# Patient Record
Sex: FEMALE | Race: WHITE | Employment: OTHER | URBAN - METROPOLITAN AREA
[De-identification: names, ages, dates, MRNs, and addresses within clinical notes are randomized per-mention and may not be internally consistent; named-entity substitution may affect disease eponyms.]

---

## 2018-06-06 ENCOUNTER — HOSPITAL ENCOUNTER (EMERGENCY)
Age: 63
Discharge: HOME | End: 2018-06-06
Attending: EMERGENCY MEDICINE | Admitting: EMERGENCY MEDICINE
Payer: OTHER GOVERNMENT

## 2018-06-06 VITALS
RESPIRATION RATE: 22 BRPM | DIASTOLIC BLOOD PRESSURE: 87 MMHG | WEIGHT: 170.2 LBS | SYSTOLIC BLOOD PRESSURE: 157 MMHG | HEART RATE: 102 BPM | OXYGEN SATURATION: 97 % | TEMPERATURE: 97.5 F

## 2018-06-06 DIAGNOSIS — T78.40XA ALLERGIC REACTION, INITIAL ENCOUNTER: Primary | ICD-10-CM

## 2018-06-06 LAB
ALBUMIN SERPL-MCNC: 4.7 G/DL (ref 3.5–5)
ALBUMIN/GLOB SERPL: 1.4 {RATIO} (ref 1.1–2.2)
ALP SERPL-CCNC: 70 U/L (ref 45–117)
ALT SERPL-CCNC: 49 U/L (ref 12–78)
ANION GAP SERPL CALC-SCNC: 10 MMOL/L (ref 5–15)
APPEARANCE UR: CLEAR
AST SERPL-CCNC: 30 U/L (ref 15–37)
BACTERIA URNS QL MICRO: NEGATIVE /HPF
BASOPHILS # BLD: 0.1 K/UL (ref 0–0.1)
BASOPHILS NFR BLD: 1 % (ref 0–1)
BILIRUB SERPL-MCNC: 0.3 MG/DL (ref 0.2–1)
BILIRUB UR QL: NEGATIVE
BUN SERPL-MCNC: 21 MG/DL (ref 6–20)
BUN/CREAT SERPL: 20 (ref 12–20)
C TRACH DNA SPEC QL NAA+PROBE: NEGATIVE
CALCIUM SERPL-MCNC: 9.4 MG/DL (ref 8.5–10.1)
CHLORIDE SERPL-SCNC: 106 MMOL/L (ref 97–108)
CLUE CELLS VAG QL WET PREP: NORMAL
CO2 SERPL-SCNC: 25 MMOL/L (ref 21–32)
COLOR UR: NORMAL
CREAT SERPL-MCNC: 1.04 MG/DL (ref 0.55–1.02)
CRP SERPL-MCNC: <0.29 MG/DL (ref 0–0.6)
DIFFERENTIAL METHOD BLD: ABNORMAL
EOSINOPHIL # BLD: 0.4 K/UL (ref 0–0.4)
EOSINOPHIL NFR BLD: 4 % (ref 0–7)
EPITH CASTS URNS QL MICRO: NORMAL /LPF
ERYTHROCYTE [DISTWIDTH] IN BLOOD BY AUTOMATED COUNT: 13.2 % (ref 11.5–14.5)
GLOBULIN SER CALC-MCNC: 3.4 G/DL (ref 2–4)
GLUCOSE SERPL-MCNC: 99 MG/DL (ref 65–100)
GLUCOSE UR STRIP.AUTO-MCNC: NEGATIVE MG/DL
HCT VFR BLD AUTO: 45.5 % (ref 35–47)
HGB BLD-MCNC: 15 G/DL (ref 11.5–16)
HGB UR QL STRIP: NEGATIVE
HYALINE CASTS URNS QL MICRO: NORMAL /LPF (ref 0–5)
IMM GRANULOCYTES # BLD: 0 K/UL (ref 0–0.04)
IMM GRANULOCYTES NFR BLD AUTO: 0 % (ref 0–0.5)
KETONES UR QL STRIP.AUTO: NEGATIVE MG/DL
KOH PREP SPEC: NORMAL
LEUKOCYTE ESTERASE UR QL STRIP.AUTO: NEGATIVE
LYMPHOCYTES # BLD: 4.2 K/UL (ref 0.8–3.5)
LYMPHOCYTES NFR BLD: 38 % (ref 12–49)
MCH RBC QN AUTO: 28.6 PG (ref 26–34)
MCHC RBC AUTO-ENTMCNC: 33 G/DL (ref 30–36.5)
MCV RBC AUTO: 86.7 FL (ref 80–99)
MONOCYTES # BLD: 0.5 K/UL (ref 0–1)
MONOCYTES NFR BLD: 4 % (ref 5–13)
N GONORRHOEA DNA SPEC QL NAA+PROBE: NEGATIVE
NEUTS SEG # BLD: 5.8 K/UL (ref 1.8–8)
NEUTS SEG NFR BLD: 53 % (ref 32–75)
NITRITE UR QL STRIP.AUTO: NEGATIVE
NRBC # BLD: 0 K/UL (ref 0–0.01)
NRBC BLD-RTO: 0 PER 100 WBC
PH UR STRIP: 6 [PH] (ref 5–8)
PLATELET # BLD AUTO: 344 K/UL (ref 150–400)
PMV BLD AUTO: 9.6 FL (ref 8.9–12.9)
POTASSIUM SERPL-SCNC: 4.3 MMOL/L (ref 3.5–5.1)
PROT SERPL-MCNC: 8.1 G/DL (ref 6.4–8.2)
PROT UR STRIP-MCNC: NEGATIVE MG/DL
RBC # BLD AUTO: 5.25 M/UL (ref 3.8–5.2)
RBC #/AREA URNS HPF: NORMAL /HPF (ref 0–5)
SAMPLE TYPE: NORMAL
SERVICE CMNT-IMP: NORMAL
SERVICE CMNT-IMP: NORMAL
SODIUM SERPL-SCNC: 141 MMOL/L (ref 136–145)
SP GR UR REFRACTOMETRY: 1.01 (ref 1–1.03)
SPECIMEN SOURCE: NORMAL
T VAGINALIS VAG QL WET PREP: NORMAL
UA: UC IF INDICATED,UAUC: NORMAL
UROBILINOGEN UR QL STRIP.AUTO: 0.2 EU/DL (ref 0.2–1)
WBC # BLD AUTO: 11 K/UL (ref 3.6–11)
WBC URNS QL MICRO: NORMAL /HPF (ref 0–4)

## 2018-06-06 PROCEDURE — 74011250637 HC RX REV CODE- 250/637: Performed by: EMERGENCY MEDICINE

## 2018-06-06 PROCEDURE — 99285 EMERGENCY DEPT VISIT HI MDM: CPT

## 2018-06-06 PROCEDURE — 87210 SMEAR WET MOUNT SALINE/INK: CPT | Performed by: EMERGENCY MEDICINE

## 2018-06-06 PROCEDURE — 87491 CHLMYD TRACH DNA AMP PROBE: CPT | Performed by: EMERGENCY MEDICINE

## 2018-06-06 PROCEDURE — 96374 THER/PROPH/DIAG INJ IV PUSH: CPT

## 2018-06-06 PROCEDURE — 74011250636 HC RX REV CODE- 250/636: Performed by: EMERGENCY MEDICINE

## 2018-06-06 PROCEDURE — 36415 COLL VENOUS BLD VENIPUNCTURE: CPT | Performed by: EMERGENCY MEDICINE

## 2018-06-06 PROCEDURE — 81001 URINALYSIS AUTO W/SCOPE: CPT | Performed by: EMERGENCY MEDICINE

## 2018-06-06 PROCEDURE — 74011000250 HC RX REV CODE- 250: Performed by: EMERGENCY MEDICINE

## 2018-06-06 PROCEDURE — 96361 HYDRATE IV INFUSION ADD-ON: CPT

## 2018-06-06 PROCEDURE — 74011250636 HC RX REV CODE- 250/636

## 2018-06-06 PROCEDURE — 85025 COMPLETE CBC W/AUTO DIFF WBC: CPT | Performed by: EMERGENCY MEDICINE

## 2018-06-06 PROCEDURE — 96372 THER/PROPH/DIAG INJ SC/IM: CPT

## 2018-06-06 PROCEDURE — 86140 C-REACTIVE PROTEIN: CPT | Performed by: EMERGENCY MEDICINE

## 2018-06-06 PROCEDURE — 87086 URINE CULTURE/COLONY COUNT: CPT | Performed by: EMERGENCY MEDICINE

## 2018-06-06 PROCEDURE — 80053 COMPREHEN METABOLIC PANEL: CPT | Performed by: EMERGENCY MEDICINE

## 2018-06-06 PROCEDURE — 96375 TX/PRO/DX INJ NEW DRUG ADDON: CPT

## 2018-06-06 PROCEDURE — 96376 TX/PRO/DX INJ SAME DRUG ADON: CPT

## 2018-06-06 RX ORDER — ACETAMINOPHEN 500 MG
1000 TABLET ORAL
Status: COMPLETED | OUTPATIENT
Start: 2018-06-06 | End: 2018-06-06

## 2018-06-06 RX ORDER — METHYLPREDNISOLONE 4 MG/1
TABLET ORAL
Qty: 1 DOSE PACK | Refills: 0 | Status: SHIPPED | OUTPATIENT
Start: 2018-06-06

## 2018-06-06 RX ORDER — FAMOTIDINE 20 MG/1
20 TABLET, FILM COATED ORAL 2 TIMES DAILY
Qty: 20 TAB | Refills: 0 | Status: SHIPPED | OUTPATIENT
Start: 2018-06-06 | End: 2018-06-16

## 2018-06-06 RX ORDER — DIPHENHYDRAMINE HCL 25 MG
25 TABLET ORAL
Qty: 30 TAB | Refills: 0 | Status: SHIPPED | OUTPATIENT
Start: 2018-06-06

## 2018-06-06 RX ORDER — DIPHENHYDRAMINE HYDROCHLORIDE 50 MG/ML
25 INJECTION, SOLUTION INTRAMUSCULAR; INTRAVENOUS
Status: COMPLETED | OUTPATIENT
Start: 2018-06-06 | End: 2018-06-06

## 2018-06-06 RX ORDER — EPINEPHRINE 1 MG/ML
INJECTION, SOLUTION, CONCENTRATE INTRAVENOUS
Status: DISCONTINUED
Start: 2018-06-06 | End: 2018-06-06 | Stop reason: HOSPADM

## 2018-06-06 RX ORDER — EPINEPHRINE 1 MG/ML
0.3 INJECTION, SOLUTION, CONCENTRATE INTRAVENOUS
Status: COMPLETED | OUTPATIENT
Start: 2018-06-06 | End: 2018-06-06

## 2018-06-06 RX ORDER — DIPHENHYDRAMINE HYDROCHLORIDE 50 MG/ML
INJECTION, SOLUTION INTRAMUSCULAR; INTRAVENOUS
Status: COMPLETED
Start: 2018-06-06 | End: 2018-06-06

## 2018-06-06 RX ORDER — DIPHENHYDRAMINE HYDROCHLORIDE 50 MG/ML
50 INJECTION, SOLUTION INTRAMUSCULAR; INTRAVENOUS
Status: COMPLETED | OUTPATIENT
Start: 2018-06-06 | End: 2018-06-06

## 2018-06-06 RX ADMIN — METHYLPREDNISOLONE SODIUM SUCCINATE 125 MG: 125 INJECTION, POWDER, FOR SOLUTION INTRAMUSCULAR; INTRAVENOUS at 00:54

## 2018-06-06 RX ADMIN — EPINEPHRINE 0.3 MG: 1 INJECTION, SOLUTION, CONCENTRATE INTRAVENOUS at 01:12

## 2018-06-06 RX ADMIN — DIPHENHYDRAMINE HYDROCHLORIDE 25 MG: 50 INJECTION, SOLUTION INTRAMUSCULAR; INTRAVENOUS at 03:46

## 2018-06-06 RX ADMIN — SODIUM CHLORIDE 1000 ML: 900 INJECTION, SOLUTION INTRAVENOUS at 01:01

## 2018-06-06 RX ADMIN — DIPHENHYDRAMINE HYDROCHLORIDE 50 MG: 50 INJECTION, SOLUTION INTRAMUSCULAR; INTRAVENOUS at 01:00

## 2018-06-06 RX ADMIN — SODIUM CHLORIDE 20 MG: 9 INJECTION INTRAMUSCULAR; INTRAVENOUS; SUBCUTANEOUS at 00:56

## 2018-06-06 RX ADMIN — ACETAMINOPHEN 1000 MG: 500 TABLET, FILM COATED ORAL at 03:45

## 2018-06-06 NOTE — ED NOTES
Pt called out, obviously frustrated, states her itching is worsening. Noted red, flat rash spreading from buttock/hips/groin radiating into back, torso, abd. Also reports pain and burning in vaginal/perineum area. Dr. Kishore Trejo notified. New orders received for Tylenol and additional Benadryl.

## 2018-06-06 NOTE — DISCHARGE INSTRUCTIONS

## 2018-06-06 NOTE — ED NOTES
Pt tolerated Benadryl without issue. States she just wants to go home, \"If I'm going to be miserable, might as well be miserable at home. \"  Advised pt that we would need to observe her a bit longer as she stated her itching was returning right as her IM Epi should be wearing off. Pt is frustrated, but understands. Son to bedside.    Will continue to monitor

## 2018-06-06 NOTE — ED NOTES
Bedside and Verbal shift change report given to Pamela [address] (oncoming nurse) by Nikhil Puri (offgoing nurse). Report included the following information SBAR, Kardex, ED Summary and MAR.

## 2018-06-06 NOTE — ED NOTES
Assumed care of patient. In NAD. Pt still has red, itchy, flat rash, but per patient and off going nurse, this is much improved. Denies any SOB or throat tightness. Lungs clear, without wheezes. Call bell in reach. Warm blankets provided. On CM x 3. Will continue to monitor as pt received Epi.

## 2018-06-06 NOTE — ED TRIAGE NOTES
TRIAGE NOTE:  Patient arrives with c/o hives. Patient reports last taking benadryl 3 hours ago without relief.

## 2018-06-06 NOTE — ED NOTES
RN present with MD to examine patient's vaginal area as she is reporting increasing itching in that area. Red rash is still very concentrated in the patient's pubic area, but no lesions to vagina or rectal area. Dr. Lombardo Diss states he would like to administer more Benadryl. Unsure what medication caused pt's throat closing and wheezing, but pt has taken Benadryl numerous times without issue.   Will await further orders

## 2018-06-06 NOTE — ED NOTES
Pt no better, no worse after second dose of Benadryl. Pt feels comfortable with discharge. Pt provided with discharge instructions. Verbalizes understanding.  Left ambulatory with steady gait, all belongings, and stable VS.  Son with patient

## 2018-06-06 NOTE — ED PROVIDER NOTES
HPI Comments: 59 y.o. female with past medical history significant for GERD, STEWARD, HLD, HTN, DM, who presents ambulatory to the ED with chief complaint of diffuse pruritic and erythematous rash to the right leg, right buttock, back, and abdomen, which started at ~1700 tonight. Patient reports that she used a topical lidocaine hemorrhoid cream on an \"inflamed hemorrhoid\" approximately three weeks ago. After using the lidocaine cream the skin became itchy and \"inflamed\" diffusely in the perianal area, so she stopped using the cream and performed sits baths. When the skin remained itchy she was called in a prescription by her PCP in [address] [place] [address] for [address]. When the Diflucan did not provide relief, her PCP wanted her to be checked for \"a bacterial infection in my vagina\". Pt states that she recently relocated to the [facility] area from [address] [place] [address], and has not yet established care with a local PCP. She has a referral to see a physician at [facility], but has not heard back from that office. Tonight when patient's rash started, her PCP in [address] [place] [address] recommended that she come to the ED. Pt denies using any new lotions, soaps, or detergents. Denies exposure to known allergens, abnormal PO intake, or new medications. She took Benadryl ~3 hours ago but it did not provide relief to the rash. Pt also notes that she feels like she has a \"lump\" in her throat, but she denies any SOB or CP. She specifically denies any fevers, chills, nausea, vomiting, diarrhea, cough, congestion, abdominal pain, difficulty urinating, dysuria, back pain, neck pain, or headache. There are no other acute medical concerns at this time. PCP: Does not have a local PCP, but is trying to make an appointment to see Dr. Ileana Cassidy at [facility]     Note written by Alessio Dyer. Gisella Porter, as dictated by Eliu Lawler MD 12:30 AM     The history is provided by the patient. No  was used.         Past Medical History:   Diagnosis Date  Hypertension        No past surgical history on file. No family history on file. Social History     Social History    Marital status: N/A     Spouse name: N/A    Number of children: N/A    Years of education: N/A     Occupational History    Not on file. Social History Main Topics    Smoking status: Not on file    Smokeless tobacco: Not on file    Alcohol use Not on file    Drug use: Not on file    Sexual activity: Not on file     Other Topics Concern    Not on file     Social History Narrative    No narrative on file         ALLERGIES: Ephedrine and Lanolin    Review of Systems   Constitutional: Negative for chills and fever. HENT: Positive for trouble swallowing (\"lump\" in throat). Negative for congestion. Respiratory: Negative for cough and shortness of breath. Cardiovascular: Negative for chest pain. Gastrointestinal: Negative for abdominal pain, constipation, diarrhea, nausea and vomiting. Genitourinary: Negative for difficulty urinating and dysuria. Positive for vaginal itching   Musculoskeletal: Negative for back pain and neck pain. Skin: Positive for rash. Neurological: Negative for headaches. All other systems reviewed and are negative. Vitals:    06/06/18 0117 06/06/18 0120 06/06/18 0125 06/06/18 0129   BP: (!) 168/97      Pulse: 99 97 93 95   Resp: 16 24 18 14   Temp:       SpO2: 98% 97% 99% 98%   Weight:                Physical Exam   Nursing note and vitals reviewed. CONSTITUTIONAL: Well-appearing; well-nourished; in mild distress  HEAD: Normocephalic; atraumatic  EYES: PERRL; EOM intact; conjunctiva and sclera are clear bilaterally. ENT: No rhinorrhea; normal pharynx with no tonsillar hypertrophy; mucous membranes pink/moist, no erythema, no exudate. NECK: Supple; non-tender; no cervical lymphadenopathy  CARD: Normal S1, S2; no murmurs, rubs, or gallops. Regular rate and rhythm.   RESP: Normal respiratory effort; breath sounds clear and equal bilaterally; no wheezes, rhonchi, or rales. ABD: Normal bowel sounds; non-distended; non-tender; no palpable organomegaly, no masses, no bruits. Back Exam: Normal inspection; no vertebral point tenderness, no CVA tenderness. Normal range of motion. EXT: Normal ROM in all four extremities; non-tender to palpation; no swelling or deformity; distal pulses are normal, no edema. SKIN: Warm; dry; Diffuse erythematous maculopapular rash c/w hives. NEURO:Alert and oriented x 3, coherent, ELEUTERIO-XII grossly intact, sensory and motor are non-focal.        MDM  Number of Diagnoses or Management Options  Allergic reaction, initial encounter:   Diagnosis management comments: Assessment: allergic reaction to unknown/ persistent itching with vaginal redness and pain, rule out yeast vaginitis versus  Cellulitis versus allergic reaction      Plan: lab/ IV fluid/ Solu-Medrol/ Benadryl/ Pepcid/ pseudo-exam/ Monitor and Reevaluate. Amount and/or Complexity of Data Reviewed  Clinical lab tests: ordered and reviewed  Tests in the radiology section of CPT®: ordered and reviewed  Tests in the medicine section of CPT®: reviewed and ordered  Discussion of test results with the performing providers: yes  Decide to obtain previous medical records or to obtain history from someone other than the patient: yes  Obtain history from someone other than the patient: yes  Review and summarize past medical records: yes  Discuss the patient with other providers: yes  Independent visualization of images, tracings, or specimens: yes    Risk of Complications, Morbidity, and/or Mortality  Presenting problems: moderate  Diagnostic procedures: moderate  Management options: moderate          ED Course       Procedures    PROGRESS NOTE:  1:17 AM   Patient was re-evaluated. Patient complaining of throat tightness and difficulty breathing. On lung exam  pt had some wheezing present, as well as voice hoarseness.  Will plan to give epinephrine IM, continue to monitor, and re-evaluate every 15 minutes. Progress Note:   Pt has been reexamined by Sterling Geiger MD. Pt is feeling much better. Symptoms have improved. All available results have been reviewed with pt and any available family. Pt understands sx, dx, and tx in ED. Care plan has been outlined and questions have been answered. Pt is ready to go home. Will send home on allergic reaction to unknown instructions. Prescription of Benadryl, Pepcid, and Solu-Medrol. Outpatient referral with PCP 3- 5 days for reevaluation and further treatmentas needed. Written by Sterling Geiger MD,3:17 AM    .   .

## 2018-06-07 LAB
BACTERIA SPEC CULT: NORMAL
CC UR VC: NORMAL
SERVICE CMNT-IMP: NORMAL